# Patient Record
(demographics unavailable — no encounter records)

---

## 2025-01-08 NOTE — HISTORY OF PRESENT ILLNESS
[FreeTextEntry1] : 34 year old KARLA BILL pt presents for 2 week post-partum visit.   She feels well and offers no complaints. She reports occasional vaginal bleeding, but no vaginal discharge or vaginitis symptoms. She is currently only taking Procardia for HTN.  She is currently trying to breastfeed but reports low supply of breastmilk.  She reports normal urination, no dysuria or incontinence of urine. BM is normal per patient, no blood in stool or constipation/diarrhea. She denies abdominal and pelvic pain.

## 2025-01-08 NOTE — COUNSELING
[Contraception/ Emergency Contraception/ Safe Sexual Practices] : contraception, emergency contraception, safe sexual practices [Confidentiality] : confidentiality [Pre/Post Op Instructions] : pre/post op instructions

## 2025-01-08 NOTE — PLAN
[FreeTextEntry1] : 34 year old KARLA BILL pt presents for 2 week post-partum visit.  2wk Post-Partum Visit: S/p C/s on 12/28/24.  Abdominal exam done today - c/s incision healing well. Dressing removed today. no s/sx PPD, precautions given C/w activity restrictions - RTO for 6wk PPA visit to be cleared for all pre-pregnancy activities  Breastfeeding Concerns: Discussed timing for successful breastfeeding.  Advised pt to use breast pump and reassured R/B/A of bottle feeding.  H/o HTN:  BP check today- normotensive Advised to use Procardia as needed. Referral given for cardiologist.   RTO for 6wk PPA visit or PRN

## 2025-01-08 NOTE — PHYSICAL EXAM
[Chaperone Present] : A chaperone was present in the examining room during all aspects of the physical examination [96922] : A chaperone was present during the pelvic exam. [Appropriately responsive] : appropriately responsive [Alert] : alert [No Acute Distress] : no acute distress [Soft] : soft [Non-tender] : non-tender [Non-distended] : non-distended [No HSM] : No HSM [No Lesions] : no lesions [No Mass] : no mass [Oriented x3] : oriented x3 [FreeTextEntry2] : Annamaria Valencia [FreeTextEntry7] : incision healing well

## 2025-02-14 NOTE — HISTORY OF PRESENT ILLNESS
[FreeTextEntry1] : Ms. Mays is a 34-year-old female presents to [facility] for a cardiovascular evaluation postpartum.  Patient delivered a baby boy on 2024, at 36 weeks gestation secondary to the development of preeclampsia, hypertension and fever.  Baby was delivered by  section.  She was treated with IV Magnesium X 24 hours for seizure prophylaxis and discharged to home on Procardia 30 mg QD.  +Family history of paternal hyperlipidemia +Personal history of obesity and history of elevated triglycerides.  Patient is not breast-feeding baby. She has discontinued Procardia to control her blood pressure. Patient reports that her blood pressures have returned to her  baseline. Blood pressure today reported to be:  110/61..  Patient reports feeling generally well and denies any significant issues with shortness of breath, chest discomfort or palpitations.

## 2025-02-14 NOTE — ASSESSMENT
[FreeTextEntry1] : Ms. Mays is a 34-year-old female presents to [facility] for a cardiovascular evaluation postpartum.  Patient delivered a baby boy on 2024, at 36 weeks gestation secondary to the development of preeclampsia, hypertension and fever.  Baby was delivered by  section.  She was treated with IV Magnesium X 24 hours for seizure prophylaxis and discharged to home on Procardia 30 mg QD.   # Postpartum hypertension    Blood pressure today reported to be:  110/61.    She has discontinued Procardia to control her blood pressure. Patient reports that her blood pressures have     returned to her baseline.    Advised patient to check BP once per week to ensure that pressures have normalized.  #Adverse Cardiovascular Risk Factors   Personal history of pre obesity, elevated triglycerides. preeclampsia, postpartum hypertension,    Family history of paternal hypertension  Recommending a baseline cardiovascular evaluation 4  months postpartum to assess risk  In-office physical examination and 12 lead EKG Echocardiogram to assess cardiac structure and function Exercise stress testing to assess functional status Full blood work panel:  CBC,CMP, Hgb A1C, TSH, Lipid profile  - Encouraged patient to participate in  healthy exercise (if cleared by OB) and eating habits, focusing on a Mediterranean style of eating and aiming for the recommended 150 minutes per week of moderate physical activity . - Encouraged the patient to find healthy outlets and coping mechanisms to help manage stress, such as physical activity/exercise, reducing workload if possible, spending time with family and friends, engaging in an enjoyable hobby, or using meditation or mindfulness techniques.  I spent 35 minutes evaluating patient's history, family medical history and blood pressure management, ordering tests and providing documentation.

## 2025-05-05 NOTE — CARDIOLOGY SUMMARY
[de-identified] : Left ventricle was not well visualized. 2. Normal left ventricular diastolic function. 3. Structurally normal mitral valve with normal leaflet excursion. 4. Mild prolapse of the anterior mitral leaflet. 5. Trace mitral regurgitation. 6. Trileaflet aortic valve with normal systolic excursion. No aortic valve stenosis. 7. No evidence of aortic regurgitation.

## 2025-05-05 NOTE — DISCUSSION/SUMMARY
[FreeTextEntry1] : In summary, Ms. Mays is a 34-year-old carries a strong FH of HLD ( dad ) and personal history of obesity ( BMI 34)  and elevated triglycerides s/p  section GA 36 due to severe PEC on 2024 obesity ( BMI 34) and mild prolpase of mitral leaflet noted on echocardiogram presents in for follow up.    25: TTE : Left ventricle poorly visualized. Normal diastolic function.Structurally normal mitral valve with normal motion. Mild anterior mitral leaflet prolapse.  Trace mitral regurgitation. Normal trileaflet aortic valve without stenosis. No aortic regurgitation.  # sPEC ; BP Stable now  off meds  Encouraged Patient to monitor BP at home, keep a log, and report results back to us for evaluation.  Additionally, encouraged a heart-healthy diet and exercise as tolerated. Patient advsied to follow up with PMD   # Prolapsed anterior mitral leaflet:  Patient advised to call if she experiences Chest discomfort, palpitations, SOB, dizziness, LH or progressively worsening fatigue   # CV risk assessment : Exercise stress test to evaluate for functional status. Patient to follow up with PMD for routine blood work and send in copy   # Obesity: BMI 34   Advised lifestyle modification: In corporate exercise and healthy eating habits, focusing on a Mediterranean style of eating and aiming for the recommended 150 minutes per week of moderate physical activity. Additionally encouraged intermittent fasting as an adjuvant for weight loss.    F/u 4 months

## 2025-05-05 NOTE — HISTORY OF PRESENT ILLNESS
[Home] : at home, [unfilled] , at the time of the visit. [Other Location: e.g. Home (Enter Location, City,State)___] : at [unfilled] [Telehealth (audio & video)] : This visit was provided via telehealth using real-time 2-way audio visual technology. [Verbal consent obtained from patient] : the patient, [unfilled] [FreeTextEntry1] : Ms. Mays is a 34-year-old carries a strong FH of HLD ( dad ) and personal history of obesity s/p  section GA 36 due to severe PEC 2024. Patient carries a strong FH paternal hyperlipidemia and Personal history of obesity ( BMI 34) and elevated triglycerides presents in for follow up.  Denies chest pain, shortness of breath, dizziness, lightheadedness, palpitations or near syncope or syncope, orthopnea, PND and increasing lower extremity edema.   25: TTE : Left ventricle poorly visualized. Normal diastolic function.Structurally normal mitral valve with normal motion. Mild anterior mitral leaflet prolapse.  Trace mitral regurgitation. Normal trileaflet aortic valve without stenosis. No aortic regurgitation.  BP:  106/70, HR 82  # sPEC ; BP Stable now  off meds  Encouraged Patient to monitor BP at home, keep a log, and report results back to us for evaluation.  Additionally, encouraged a heart-healthy diet and exercise as tolerated. Patient advsied to follow up with PMD   # Prolapsed anterior mitral leaflet:  Patient advised to call if she experiences Chest discomfort, palpitations, SOB, dizziness, LH or progressively worsening fatigue   # CV risk assessment : Exercise stress test to evaluate for functional status.